# Patient Record
Sex: MALE | Race: BLACK OR AFRICAN AMERICAN | NOT HISPANIC OR LATINO | URBAN - METROPOLITAN AREA
[De-identification: names, ages, dates, MRNs, and addresses within clinical notes are randomized per-mention and may not be internally consistent; named-entity substitution may affect disease eponyms.]

---

## 2020-11-23 ENCOUNTER — OFFICE VISIT (OUTPATIENT)
Dept: URBAN - METROPOLITAN AREA SURGERY CENTER 27 | Facility: SURGERY CENTER | Age: 75
End: 2020-11-23

## 2021-07-14 ENCOUNTER — OFFICE VISIT (OUTPATIENT)
Dept: URBAN - METROPOLITAN AREA CLINIC 84 | Facility: CLINIC | Age: 76
End: 2021-07-14
Payer: COMMERCIAL

## 2021-07-14 ENCOUNTER — WEB ENCOUNTER (OUTPATIENT)
Dept: URBAN - METROPOLITAN AREA CLINIC 84 | Facility: CLINIC | Age: 76
End: 2021-07-14

## 2021-07-14 DIAGNOSIS — E78.5 HYPERLIPIDEMIA, UNSPECIFIED HYPERLIPIDEMIA TYPE: ICD-10-CM

## 2021-07-14 DIAGNOSIS — E11.9 TYPE 2 DIABETES MELLITUS WITHOUT COMPLICATIONS: ICD-10-CM

## 2021-07-14 DIAGNOSIS — Z79.4 LONG TERM (CURRENT) USE OF INSULIN: ICD-10-CM

## 2021-07-14 DIAGNOSIS — Z12.11 COLON CANCER SCREENING: ICD-10-CM

## 2021-07-14 DIAGNOSIS — B19.20 HEPATITIS C VIRUS INFECTION WITHOUT HEPATIC COMA, UNSPECIFIED CHRONICITY: ICD-10-CM

## 2021-07-14 DIAGNOSIS — I10 HYPERTENSION, UNSPECIFIED TYPE: ICD-10-CM

## 2021-07-14 PROCEDURE — 99204 OFFICE O/P NEW MOD 45 MIN: CPT | Performed by: INTERNAL MEDICINE

## 2021-07-14 RX ORDER — AMLODIPINE BESYLATE 10 MG/1
1 TABLET TABLET ORAL ONCE A DAY
Status: ACTIVE | COMMUNITY

## 2021-07-14 RX ORDER — KRILL/OM-3/DHA/EPA/PHOSPHO/AST 1000-230MG
1 TABLET CAPSULE ORAL ONCE A DAY
Status: ACTIVE | COMMUNITY

## 2021-07-14 RX ORDER — LISINOPRIL 40 MG/1
1 TABLET TABLET ORAL ONCE A DAY
Status: ACTIVE | COMMUNITY

## 2021-07-14 RX ORDER — GLIPIZIDE 10 MG/1
1 TABLET 30 MINUTES BEFORE BREAKFAST TABLET ORAL ONCE A DAY
Status: ACTIVE | COMMUNITY

## 2021-07-14 RX ORDER — ATORVASTATIN CALCIUM 80 MG/1
1 TABLET TABLET, FILM COATED ORAL ONCE A DAY
Status: ACTIVE | COMMUNITY

## 2021-07-14 RX ORDER — INSULIN GLARGINE 100 [IU]/ML
AS DIRECTED INJECTION, SOLUTION SUBCUTANEOUS
Status: ACTIVE | COMMUNITY

## 2021-07-23 ENCOUNTER — OFFICE VISIT (OUTPATIENT)
Dept: URBAN - METROPOLITAN AREA SURGERY CENTER 20 | Facility: SURGERY CENTER | Age: 76
End: 2021-07-23
Payer: COMMERCIAL

## 2021-07-23 DIAGNOSIS — Z12.11 COLON CANCER SCREENING: ICD-10-CM

## 2021-07-23 PROCEDURE — G0121 COLON CA SCRN NOT HI RSK IND: HCPCS | Performed by: INTERNAL MEDICINE

## 2021-07-23 PROCEDURE — G8907 PT DOC NO EVENTS ON DISCHARG: HCPCS | Performed by: INTERNAL MEDICINE

## 2021-07-23 RX ORDER — GLIPIZIDE 10 MG/1
1 TABLET 30 MINUTES BEFORE BREAKFAST TABLET ORAL ONCE A DAY
Status: ACTIVE | COMMUNITY

## 2021-07-23 RX ORDER — ATORVASTATIN CALCIUM 80 MG/1
1 TABLET TABLET, FILM COATED ORAL ONCE A DAY
Status: ACTIVE | COMMUNITY

## 2021-07-23 RX ORDER — KRILL/OM-3/DHA/EPA/PHOSPHO/AST 1000-230MG
1 TABLET CAPSULE ORAL ONCE A DAY
Status: ACTIVE | COMMUNITY

## 2021-07-23 RX ORDER — INSULIN GLARGINE 100 [IU]/ML
AS DIRECTED INJECTION, SOLUTION SUBCUTANEOUS
Status: ACTIVE | COMMUNITY

## 2021-07-23 RX ORDER — AMLODIPINE BESYLATE 10 MG/1
1 TABLET TABLET ORAL ONCE A DAY
Status: ACTIVE | COMMUNITY

## 2021-07-23 RX ORDER — LISINOPRIL 40 MG/1
1 TABLET TABLET ORAL ONCE A DAY
Status: ACTIVE | COMMUNITY

## 2021-07-26 LAB
A/G RATIO: 1.3
ALBUMIN: 4
ALKALINE PHOSPHATASE: 74
ALT (SGPT): 151
AST (SGOT): 107
BASO (ABSOLUTE): 0
BASOS: 0
BILIRUBIN, TOTAL: 0.4
BUN/CREATININE RATIO: 17
BUN: 25
CALCIUM: 9.5
CARBON DIOXIDE, TOTAL: 20
CHLORIDE: 107
CREATININE: 1.48
EGFR IF AFRICN AM: 53
EGFR IF NONAFRICN AM: 46
EOS (ABSOLUTE): 0.1
EOS: 1
GLOBULIN, TOTAL: 3
GLUCOSE: 101
HBSAG SCREEN: NEGATIVE
HCV AB: >11
HCV GENOTYPE: (no result)
HCV LOG10: (no result)
HCV LOG10: 5.31
HEMATOCRIT: 40.9
HEMATOLOGY COMMENTS:: (no result)
HEMOGLOBIN: 13.3
HEP A AB, IGM: NEGATIVE
HEP A AB, TOTAL: NEGATIVE
HEP B CORE AB, IGM: NEGATIVE
HEP B CORE AB, TOT: POSITIVE
HEP B SURFACE AB, QUAL: NON REACTIVE
HEP BE AB: POSITIVE
HEP BE AG: NEGATIVE
HEPATITIS C GENOTYPE: (no result)
HEPATITIS C QUANTITATION: (no result)
HEPATITIS C QUANTITATION: (no result)
IMMATURE CELLS: (no result)
IMMATURE GRANS (ABS): 0
IMMATURE GRANULOCYTES: 0
INTERPRETATION:: (no result)
LYMPHS (ABSOLUTE): 3.5
LYMPHS: 50
Lab: (no result)
Lab: (no result)
MCH: 29.5
MCHC: 32.5
MCV: 91
MONOCYTES(ABSOLUTE): 0.7
MONOCYTES: 10
NEUTROPHILS (ABSOLUTE): 2.9
NEUTROPHILS: 39
NRBC: (no result)
PLATELETS: 206
POTASSIUM: 4.9
PROTEIN, TOTAL: 7
RBC: 4.51
RDW: 13
REQUEST PROBLEM: (no result)
SODIUM: 141
TEST INFORMATION:: (no result)
TEST INFORMATION:: (no result)
WBC: 7.3

## 2021-08-28 ENCOUNTER — TELEPHONE ENCOUNTER (OUTPATIENT)
Dept: URBAN - METROPOLITAN AREA CLINIC 13 | Facility: CLINIC | Age: 76
End: 2021-08-28

## 2021-08-29 ENCOUNTER — TELEPHONE ENCOUNTER (OUTPATIENT)
Dept: URBAN - METROPOLITAN AREA CLINIC 13 | Facility: CLINIC | Age: 76
End: 2021-08-29

## 2021-08-29 RX ORDER — ATORVASTATIN CALCIUM 80 MG/1
TABLET ORAL
Status: ACTIVE | COMMUNITY

## 2021-08-29 RX ORDER — GLIPIZIDE 5 MG/1
TABLET, EXTENDED RELEASE ORAL
Status: ACTIVE | COMMUNITY

## 2021-08-29 RX ORDER — ASPIRIN 81 MG/1
TABLET, COATED ORAL
Status: ACTIVE | COMMUNITY

## 2021-08-29 RX ORDER — LISINOPRIL 10 MG/1
TABLET ORAL
Status: ACTIVE | COMMUNITY

## 2021-09-07 ENCOUNTER — TELEPHONE ENCOUNTER (OUTPATIENT)
Dept: URBAN - METROPOLITAN AREA CLINIC 40 | Facility: CLINIC | Age: 76
End: 2021-09-07

## 2021-09-08 ENCOUNTER — TELEPHONE ENCOUNTER (OUTPATIENT)
Dept: URBAN - METROPOLITAN AREA CLINIC 92 | Facility: CLINIC | Age: 76
End: 2021-09-08

## 2021-10-11 ENCOUNTER — OFFICE VISIT (OUTPATIENT)
Dept: URBAN - METROPOLITAN AREA CLINIC 86 | Facility: CLINIC | Age: 76
End: 2021-10-11
Payer: COMMERCIAL

## 2021-10-11 VITALS
BODY MASS INDEX: 25.87 KG/M2 | TEMPERATURE: 96.5 F | DIASTOLIC BLOOD PRESSURE: 72 MMHG | HEART RATE: 73 BPM | SYSTOLIC BLOOD PRESSURE: 128 MMHG | HEIGHT: 72 IN | WEIGHT: 191 LBS

## 2021-10-11 DIAGNOSIS — R74.8 ELEVATED LIVER ENZYMES: ICD-10-CM

## 2021-10-11 DIAGNOSIS — E11.9 TYPE 2 DIABETES MELLITUS WITHOUT COMPLICATIONS: ICD-10-CM

## 2021-10-11 DIAGNOSIS — R93.2 ABNORMAL ULTRASOUND OF LIVER: ICD-10-CM

## 2021-10-11 DIAGNOSIS — Z71.85 VACCINE COUNSELING: ICD-10-CM

## 2021-10-11 DIAGNOSIS — B19.20 HEPATITIS C VIRUS INFECTION WITHOUT HEPATIC COMA, UNSPECIFIED CHRONICITY: ICD-10-CM

## 2021-10-11 DIAGNOSIS — Z95.0 PACEMAKER: ICD-10-CM

## 2021-10-11 DIAGNOSIS — C61 PROSTATE CANCER: ICD-10-CM

## 2021-10-11 DIAGNOSIS — F03.91 DEMENTIA: ICD-10-CM

## 2021-10-11 DIAGNOSIS — E78.5 HYPERLIPIDEMIA, UNSPECIFIED HYPERLIPIDEMIA TYPE: ICD-10-CM

## 2021-10-11 DIAGNOSIS — Z12.11 COLON CANCER SCREENING: ICD-10-CM

## 2021-10-11 DIAGNOSIS — N28.9 RENAL INSUFFICIENCY: ICD-10-CM

## 2021-10-11 DIAGNOSIS — Z79.4 LONG TERM (CURRENT) USE OF INSULIN: ICD-10-CM

## 2021-10-11 DIAGNOSIS — I10 HYPERTENSION, UNSPECIFIED TYPE: ICD-10-CM

## 2021-10-11 DIAGNOSIS — R76.8 HEPATITIS B CORE ANTIBODY POSITIVE: ICD-10-CM

## 2021-10-11 DIAGNOSIS — Z86.73: ICD-10-CM

## 2021-10-11 PROBLEM — 38341003 HYPERTENSION: Status: ACTIVE | Noted: 2020-03-02

## 2021-10-11 PROBLEM — 13644009 HYPERCHOLESTEROLEMIA: Status: ACTIVE | Noted: 2020-03-02

## 2021-10-11 PROBLEM — 313435000 TYPE I DIABETES MELLITUS WITHOUT COMPLICATION: Status: ACTIVE | Noted: 2020-03-02

## 2021-10-11 PROCEDURE — 99244 OFF/OP CNSLTJ NEW/EST MOD 40: CPT | Performed by: PHYSICIAN ASSISTANT

## 2021-10-11 RX ORDER — KRILL/OM-3/DHA/EPA/PHOSPHO/AST 1000-230MG
1 TABLET CAPSULE ORAL ONCE A DAY
Status: ACTIVE | COMMUNITY

## 2021-10-11 RX ORDER — LISINOPRIL 40 MG/1
1 TABLET TABLET ORAL ONCE A DAY
Status: ACTIVE | COMMUNITY

## 2021-10-11 RX ORDER — AMLODIPINE BESYLATE 10 MG/1
1 TABLET TABLET ORAL ONCE A DAY
Status: ACTIVE | COMMUNITY

## 2021-10-11 RX ORDER — INSULIN GLARGINE 100 [IU]/ML
AS DIRECTED INJECTION, SOLUTION SUBCUTANEOUS
Status: ACTIVE | COMMUNITY

## 2021-10-11 RX ORDER — GLIPIZIDE 10 MG/1
1 TABLET 30 MINUTES BEFORE BREAKFAST TABLET ORAL ONCE A DAY
Status: ACTIVE | COMMUNITY

## 2021-10-11 NOTE — HPI-TODAY'S VISIT:
Pt is a 75 year old male who presents today for evaluation of HCV, referred by Dr. Garry Morgan.  A copy of today's note will be sent to referring provider.  He presents today with daughter, Charlene.  He was diagnosed with Hep C about 20 years ago, genotype 1a, treatment naive.  Hx of IVDU, last used about 20 years ago.  Hx of heavy etoh use, quit in past 2-3 years.  Was drinking on average about 1-2 pints vodka per week x 40 years.  Hx of tobacco use, 1 pack per week x 20 years, quit 2 years ago.  No blood transfusions, tattoos,  background.  LFTs elevated with , , alp 74.  Plt 206.  Hep B core noted to be positive.  US 7/19/21 showing heterogenous appearance of liver with midly nodular contour concerning for possible chronic hepatic disease, cirrhosis.  He has a pacemaker, unsure if MRI compatible- will check with cardiologist regarding this.  Unclear if ever had EGD in past.   Has never been dx with cirrhosis in past.    Denies symptoms of abdominal distention, worsening confusion (has mild dementia), abdominal pain, changes in BM, weight changes, signs of GI bleeding.     Hx of DM on insulin- last a1c around 6%, only recent controlled over past year.  Hx of atleast 6 years of poorly controlled DM which led to elevated creatinine.  No prior nephrology eval.   He has hx of HLD, prev on atorvastatin but this was d/c by pcp about 3 months ago, unclear why.  Hx of prostate cancer about 5 years ago, s/p radiation, no evidence of recurrence.  Sister with pancreatic cancer.  No known family hx of liver disease/cirrhosis.  Denies herbals/supplements taken.  Labs 7/14/21- Hep a ab total negative (recommend vaccine), glucose 101, creatinine 1.48, ast 107, alt 151, alp 74, sodium 141, potassium 4.9, wbc 7.3, hgb 13.3, plt 206, hep b core ab total positive, hep be ab positive, hep b surf ab negative (recommend vaccine), hep be ag negative, hep b surf ag negative, hep b core ab igm negative, Hep C viral load 203,000.  Colonoscopy 7/23/21 with Dr. Morgan, nonbleeding IH otherwise normal, repeat 5 years due to poor bowel prep.    US Abdominal 7/19/21- The liver is normal in size with heterogenous echotexture and normal echogenicity.  There is mildy nodular contour.  No focal hepatic lesions.  No intrahepatic or extrahepatic biliary ductal dilatation.  PV demonstrates hepatopedal flow.  Gallbladder is normal with no sludge or cholelithiasis.  CBD measures 2.2 mm.  Pancreas is unremarkable, pancreatic tail is obscured by overlying bowel gas.   The right kidney is normal size and measures 10.2 cm without hydronephrosis, mildly increased echogenicity.  No ascites.  IMPRESSION: liver has a heterogenous appearance with a mildly nodular contour which may correlate with chronic hepatic disease, cirrhosis.  Right kidney demonstrates mildly increased echogenicity which may correlate with medical renal disease.

## 2021-10-11 NOTE — EXAM-PHYSICAL EXAM
General: pleasant, well developed, well nourished, no acute distress, non ill appearing Eyes- no scleral icterus HENT: normocephalic, atraumatic head, face mask covering mouth and nose Neck: supple, no JVD Chest: normal breath sounds, normal work of breathing Heart: regular rate and rhythm without murmur Abdomen: soft, non tender, non distended, bowel sounds present, no hepatomegaly, no splenomegaly, no ascites Musculoskeletal: ambulatory with assistance of cane- presented to clinic in wheelchair Extremities: no edema, no asterixis, + palmar erythema Skin: no rashes, no jaundice Neurologic: no focal deficits, alert and oriented x 3 Psychiatric: stable mood, appropriate affect

## 2021-10-17 LAB
A/G RATIO: 1.7
ALBUMIN: 4.5
ALKALINE PHOSPHATASE: 75
ALPHA-1-ANTITRYPSIN, SERUM: 174
ALT (SGPT): 89
ANA DIRECT: NEGATIVE
AST (SGOT): 72
BASO (ABSOLUTE): 0
BASOS: 0
BILIRUBIN, TOTAL: 0.4
BUN/CREATININE RATIO: 21
BUN: 29
CALCIUM: 10
CARBON DIOXIDE, TOTAL: 20
CHLORIDE: 108
CREATININE: 1.39
EGFR IF AFRICN AM: 58
EGFR IF NONAFRICN AM: 50
EOS (ABSOLUTE): 0.1
EOS: 2
FERRITIN, SERUM: 623
GLOBULIN, TOTAL: 2.7
GLUCOSE: 68
HCV LOG10: 5.15
HEMATOCRIT: 43.5
HEMATOLOGY COMMENTS:: (no result)
HEMOGLOBIN: 14.1
HEPATITIS C QUANTITATION: (no result)
IMMATURE CELLS: (no result)
IMMATURE GRANS (ABS): 0
IMMATURE GRANULOCYTES: 0
IRON BIND.CAP.(TIBC): 296
IRON SATURATION: 33
IRON: 99
LYMPHS (ABSOLUTE): 3
LYMPHS: 43
MCH: 29.7
MCHC: 32.4
MCV: 92
MITOCHONDRIAL (M2) ANTIBODY: 122.6
MONOCYTES(ABSOLUTE): 1
MONOCYTES: 15
NEUTROPHILS (ABSOLUTE): 2.8
NEUTROPHILS: 40
NRBC: (no result)
PHENOTYPE (PI): (no result)
PLATELETS: 229
POTASSIUM: 4.9
PROTEIN, TOTAL: 7.2
RBC: 4.74
RDW: 12.4
SODIUM: 142
TEST INFORMATION:: (no result)
UIBC: 197
WBC: 7

## 2021-10-19 ENCOUNTER — TELEPHONE ENCOUNTER (OUTPATIENT)
Dept: URBAN - METROPOLITAN AREA CLINIC 92 | Facility: CLINIC | Age: 76
End: 2021-10-19

## 2021-10-19 ENCOUNTER — OFFICE VISIT (OUTPATIENT)
Dept: URBAN - METROPOLITAN AREA CLINIC 86 | Facility: CLINIC | Age: 76
End: 2021-10-19

## 2021-10-19 NOTE — HPI-TODAY'S VISIT:
10/12/21 labs- iron labs normal, ferritin elevated at 623, ama elevated at 122.6, alpha 1 antitrypsin 174 with normal phenotype MM, creatinine elevated 1.39 (please share with primary MD), bun elevated 29 (please share with primary MD), alk phos 75, ast 72, alt 89, audelia negative, plt 229, hgb 14.1, wbc 7, monocytes 1.0 elevated (please share with primary mD), hep c viral load 142,425.   INR and ASMA were not collected by lab so we'll need to get those with next lab draw.  Need to also check HFE (given elevated ferritin) and IGG and IGM as well given positive AMA.  We'll send those orders to [facility] for you to obain.   Any news on whether the pacemaker is MRI compatible?

## 2021-11-11 ENCOUNTER — OFFICE VISIT (OUTPATIENT)
Dept: URBAN - METROPOLITAN AREA CLINIC 86 | Facility: CLINIC | Age: 76
End: 2021-11-11

## 2021-12-03 ENCOUNTER — LAB OUTSIDE AN ENCOUNTER (OUTPATIENT)
Dept: URBAN - METROPOLITAN AREA CLINIC 86 | Facility: CLINIC | Age: 76
End: 2021-12-03

## 2021-12-07 ENCOUNTER — OFFICE VISIT (OUTPATIENT)
Dept: URBAN - METROPOLITAN AREA CLINIC 86 | Facility: CLINIC | Age: 76
End: 2021-12-07

## 2021-12-07 RX ORDER — KRILL/OM-3/DHA/EPA/PHOSPHO/AST 1000-230MG
1 TABLET CAPSULE ORAL ONCE A DAY
Status: ACTIVE | COMMUNITY

## 2021-12-07 RX ORDER — INSULIN GLARGINE 100 [IU]/ML
AS DIRECTED INJECTION, SOLUTION SUBCUTANEOUS
Status: ACTIVE | COMMUNITY

## 2021-12-07 RX ORDER — AMLODIPINE BESYLATE 10 MG/1
1 TABLET TABLET ORAL ONCE A DAY
Status: ACTIVE | COMMUNITY

## 2021-12-07 RX ORDER — GLIPIZIDE 10 MG/1
1 TABLET 30 MINUTES BEFORE BREAKFAST TABLET ORAL ONCE A DAY
Status: ACTIVE | COMMUNITY

## 2021-12-07 RX ORDER — LISINOPRIL 40 MG/1
1 TABLET TABLET ORAL ONCE A DAY
Status: ACTIVE | COMMUNITY

## 2021-12-09 ENCOUNTER — TELEPHONE ENCOUNTER (OUTPATIENT)
Dept: URBAN - METROPOLITAN AREA CLINIC 92 | Facility: CLINIC | Age: 76
End: 2021-12-09

## 2021-12-09 LAB
A/G RATIO: 1.4
ACTIN (SMOOTH MUSCLE) ANTIBODY: 7
ALBUMIN: 4.5
ALKALINE PHOSPHATASE: 68
ALPHA-1-ANTITRYPSIN, SERUM: 152
ALT (SGPT): 84
ANA DIRECT: NEGATIVE
AST (SGOT): 58
BASO (ABSOLUTE): 0
BASOS: 0
BILIRUBIN, TOTAL: 0.6
BUN/CREATININE RATIO: 21
BUN: 36
CALCIUM: 9.9
CARBON DIOXIDE, TOTAL: 19
CHLORIDE: 107
CREATININE: 1.68
EGFR IF AFRICN AM: 46
EGFR IF NONAFRICN AM: 39
EOS (ABSOLUTE): 0.1
EOS: 2
FERRITIN, SERUM: 656
GLOBULIN, TOTAL: 3.2
GLUCOSE: 110
HCV LOG10: 4.67
HEMATOCRIT: 41.7
HEMATOLOGY COMMENTS:: (no result)
HEMOGLOBIN: 13.8
HEPATITIS C QUANTITATION: (no result)
IMMATURE CELLS: (no result)
IMMATURE GRANS (ABS): 0
IMMATURE GRANULOCYTES: 0
INR: 1
IRON BIND.CAP.(TIBC): 307
IRON SATURATION: 36
IRON: 109
LYMPHS (ABSOLUTE): 2.7
LYMPHS: 44
MCH: 29.3
MCHC: 33.1
MCV: 89
MITOCHONDRIAL (M2) ANTIBODY: 126.4
MONOCYTES(ABSOLUTE): 0.6
MONOCYTES: 10
NEUTROPHILS (ABSOLUTE): 2.7
NEUTROPHILS: 44
NRBC: (no result)
PHENOTYPE (PI): (no result)
PLATELETS: 245
POTASSIUM: 5.2
PROTEIN, TOTAL: 7.7
PROTHROMBIN TIME: 10.6
RBC: 4.71
RDW: 12.5
SODIUM: 140
TEST INFORMATION:: (no result)
UIBC: 198
WBC: 6.2

## 2021-12-09 NOTE — HPI-TODAY'S VISIT:
labs 12/3/21- iron sat 36% normal, ferritin 656 elevated (prev 623), .4, alpha 1 152, normal phenotype MM, glucose 110 elevated (please share with primary MD), BUN elevated 36, creatinine 1.68 elevated (and up from previous of 1.39, please share with primary MD), chloride 107 high, sodium 140 normal, potassium 5.2 normal, albumin 4.5 normal, total bili 0.6 normal, alk phos 68 normal, ast 58 elevated, alt 84 elevated, audelia negative, wbc 6.2 normal, hemoglobin 13.8 normal, platelets 245 normal, hep c viral load 47,180, ASMA negative, inr 1.0.    It doesn't appear that [facility] paige with HFE (need due to elevated ferritin) and IGG and IGM (need due to positive AMA).  We'll send you the orders for you to obtain those labs.   Have you found out if your pacemaker is MRI compatible?

## 2021-12-23 ENCOUNTER — TELEPHONE ENCOUNTER (OUTPATIENT)
Dept: URBAN - METROPOLITAN AREA CLINIC 86 | Facility: CLINIC | Age: 76
End: 2021-12-23

## 2021-12-23 ENCOUNTER — OFFICE VISIT (OUTPATIENT)
Dept: URBAN - METROPOLITAN AREA CLINIC 86 | Facility: CLINIC | Age: 76
End: 2021-12-23
Payer: COMMERCIAL

## 2021-12-23 VITALS — WEIGHT: 191 LBS | BODY MASS INDEX: 25.87 KG/M2 | HEIGHT: 72 IN

## 2021-12-23 DIAGNOSIS — R93.2 ABNORMAL ULTRASOUND OF LIVER: ICD-10-CM

## 2021-12-23 DIAGNOSIS — B18.2 CARRIER OF VIRAL HEPATITIS C: ICD-10-CM

## 2021-12-23 DIAGNOSIS — R76.8 HEPATITIS B CORE ANTIBODY POSITIVE: ICD-10-CM

## 2021-12-23 DIAGNOSIS — Z86.73: ICD-10-CM

## 2021-12-23 DIAGNOSIS — R79.89 ELEVATED FERRITIN: ICD-10-CM

## 2021-12-23 DIAGNOSIS — R76.0 RAISED ANTIBODY TITER: ICD-10-CM

## 2021-12-23 DIAGNOSIS — R74.8 ELEVATED LIVER ENZYMES: ICD-10-CM

## 2021-12-23 DIAGNOSIS — I10 HYPERTENSION, UNSPECIFIED TYPE: ICD-10-CM

## 2021-12-23 DIAGNOSIS — E11.9 TYPE 2 DIABETES MELLITUS WITHOUT COMPLICATIONS: ICD-10-CM

## 2021-12-23 DIAGNOSIS — Z12.11 COLON CANCER SCREENING: ICD-10-CM

## 2021-12-23 DIAGNOSIS — Z71.85 VACCINE COUNSELING: ICD-10-CM

## 2021-12-23 DIAGNOSIS — Z79.4 LONG TERM (CURRENT) USE OF INSULIN: ICD-10-CM

## 2021-12-23 PROCEDURE — 99214 OFFICE O/P EST MOD 30 MIN: CPT | Performed by: PHYSICIAN ASSISTANT

## 2021-12-23 RX ORDER — KRILL/OM-3/DHA/EPA/PHOSPHO/AST 1000-230MG
1 TABLET CAPSULE ORAL ONCE A DAY
Status: ACTIVE | COMMUNITY

## 2021-12-23 RX ORDER — AMLODIPINE BESYLATE 10 MG/1
1 TABLET TABLET ORAL ONCE A DAY
Status: ACTIVE | COMMUNITY

## 2021-12-23 RX ORDER — INSULIN GLARGINE 100 [IU]/ML
AS DIRECTED INJECTION, SOLUTION SUBCUTANEOUS
Status: ACTIVE | COMMUNITY

## 2021-12-23 RX ORDER — GLIPIZIDE 10 MG/1
1 TABLET 30 MINUTES BEFORE BREAKFAST TABLET ORAL ONCE A DAY
Status: ACTIVE | COMMUNITY

## 2021-12-23 RX ORDER — LISINOPRIL 40 MG/1
1 TABLET TABLET ORAL ONCE A DAY
Status: ACTIVE | COMMUNITY

## 2021-12-23 NOTE — PHYSICAL EXAM CONSTITUTIONAL:
pleasant, well nourished, well developed, in no acute distress , normal communication ability [Restricted in physically strenuous activity but ambulatory and able to carry out work of a light or sedentary nature] : Status 1- Restricted in physically strenuous activity but ambulatory and able to carry out work of a light or sedentary nature, e.g., light house work, office work [Normal] : affect appropriate [de-identified] : B/L cataracts appreciated [de-identified] : pruritic reaction from  Avastin  treatment.  [de-identified] : CTA B/L

## 2021-12-23 NOTE — HPI-TODAY'S VISIT:
Pt is a 76 year old male who presents today for evaluation of HCV, referred by Dr. Garry Morgan.  A copy of today's note will be sent to referring provider.  Pt presents for telehealth with daughter, Charlene.  Has not yet found out if pacemaker is MRI compatible but daughter will do so.  Feels well, following with pcp for glucose/creatinine issues.  No alcohol or new meds/herbals/supplements.  WIll be due for hcc screening 01/2022 (MRI preferred but will get US if pacemaker is not compatible.   Will refer back to Dr. Morgan for EGD as US 7/2021 concerning for cirrhosis.  Need to evaluate this prior to hep c tx.  Prev discussed Epclusa in detail.  Recent LFTS improved from 07/2021.  Ferritin elevated- will get HFE,  AMA positive- will f/u on igg and igm.    labs 12/3/21- iron sat 36% normal, ferritin 656 elevated (prev 623), .4, alpha 1 152, normal phenotype MM, glucose 110 elevated (please share with primary MD), BUN elevated 36, creatinine 1.68 elevated (and up from previous of 1.39, please share with primary MD), chloride 107 high, sodium 140 normal, potassium 5.2 normal, albumin 4.5 normal, total bili 0.6 normal, alk phos 68 normal, ast 58 elevated, alt 84 elevated, audelia negative, wbc 6.2 normal, hemoglobin 13.8 normal, platelets 245 normal, hep c viral load 47,180, ASMA negative, inr 1.0.    10/12/21 labs- iron labs normal, ferritin elevated at 623, ama elevated at 122.6, alpha 1 antitrypsin 174 with normal phenotype MM, creatinine elevated 1.39 (please share with primary MD), bun elevated 29 (please share with primary MD), alk phos 75, ast 72, alt 89, audelia negative, plt 229, hgb 14.1, wbc 7, monocytes 1.0 elevated (please share with primary mD), hep c viral load 142,425.   RECAP: He presents today with daughter, Charlene.  He was diagnosed with Hep C about 20 years ago, genotype 1a, treatment naive.  Hx of IVDU, last used about 20 years ago.  Hx of heavy etoh use, quit in past 2-3 years.  Was drinking on average about 1-2 pints vodka per week x 40 years.  Hx of tobacco use, 1 pack per week x 20 years, quit 2 years ago.  No blood transfusions, tattoos,  background.  LFTs elevated 7/2021 with , , alp 74.  Plt 206.  Hep B core noted to be positive.    US 7/19/21 showing heterogenous appearance of liver with midly nodular contour concerning for possible chronic hepatic disease, cirrhosis.  He has a pacemaker, unsure if MRI compatible- will check with cardiologist regarding this.  Unclear if ever had EGD in past.   Has never been dx with cirrhosis in past.    Denies symptoms of abdominal distention, worsening confusion (has mild dementia), abdominal pain, changes in BM, weight changes, signs of GI bleeding.     Hx of DM on insulin- last a1c around 6%, only recent controlled over past year.  Hx of atleast 6 years of poorly controlled DM which led to elevated creatinine.  No prior nephrology eval, pcp is follpwing.   He has hx of HLD, prev on atorvastatin but this was d/c by pcp about 3 months ago, unclear why.  Hx of prostate cancer about 5 years ago, s/p radiation, no evidence of recurrence.  Sister with pancreatic cancer.  No known family hx of liver disease/cirrhosis.  Denies herbals/supplements taken.  Labs 7/14/21- Hep a ab total negative (recommend vaccine), glucose 101, creatinine 1.48, ast 107, alt 151, alp 74, sodium 141, potassium 4.9, wbc 7.3, hgb 13.3, plt 206, hep b core ab total positive, hep be ab positive, hep b surf ab negative (recommend vaccine), hep be ag negative, hep b surf ag negative, hep b core ab igm negative, Hep C viral load 203,000.  Colonoscopy 7/23/21 with Dr. Morgan, nonbleeding IH otherwise normal, repeat 5 years due to poor bowel prep.    US Abdominal 7/19/21- The liver is normal in size with heterogenous echotexture and normal echogenicity.  There is mildy nodular contour.  No focal hepatic lesions.  No intrahepatic or extrahepatic biliary ductal dilatation.  PV demonstrates hepatopedal flow.  Gallbladder is normal with no sludge or cholelithiasis.  CBD measures 2.2 mm.  Pancreas is unremarkable, pancreatic tail is obscured by overlying bowel gas.   The right kidney is normal size and measures 10.2 cm without hydronephrosis, mildly increased echogenicity.  No ascites.  IMPRESSION: liver has a heterogenous appearance with a mildly nodular contour which may correlate with chronic hepatic disease, cirrhosis.  Right kidney demonstrates mildly increased echogenicity which may correlate with medical renal disease.

## 2022-01-12 ENCOUNTER — OFFICE VISIT (OUTPATIENT)
Dept: URBAN - METROPOLITAN AREA CLINIC 84 | Facility: CLINIC | Age: 77
End: 2022-01-12

## 2022-01-12 RX ORDER — LISINOPRIL 40 MG/1
1 TABLET TABLET ORAL ONCE A DAY
COMMUNITY

## 2022-01-12 RX ORDER — INSULIN GLARGINE 100 [IU]/ML
AS DIRECTED INJECTION, SOLUTION SUBCUTANEOUS
COMMUNITY

## 2022-01-12 RX ORDER — KRILL/OM-3/DHA/EPA/PHOSPHO/AST 1000-230MG
1 TABLET CAPSULE ORAL ONCE A DAY
COMMUNITY

## 2022-01-12 RX ORDER — AMLODIPINE BESYLATE 10 MG/1
1 TABLET TABLET ORAL ONCE A DAY
COMMUNITY

## 2022-01-12 RX ORDER — GLIPIZIDE 10 MG/1
1 TABLET 30 MINUTES BEFORE BREAKFAST TABLET ORAL ONCE A DAY
COMMUNITY

## 2022-01-21 ENCOUNTER — LAB OUTSIDE AN ENCOUNTER (OUTPATIENT)
Dept: URBAN - METROPOLITAN AREA CLINIC 84 | Facility: CLINIC | Age: 77
End: 2022-01-21

## 2022-01-25 ENCOUNTER — OFFICE VISIT (OUTPATIENT)
Dept: URBAN - METROPOLITAN AREA TELEHEALTH 2 | Facility: TELEHEALTH | Age: 77
End: 2022-01-25
Payer: COMMERCIAL

## 2022-01-25 ENCOUNTER — TELEPHONE ENCOUNTER (OUTPATIENT)
Dept: URBAN - METROPOLITAN AREA CLINIC 105 | Facility: CLINIC | Age: 77
End: 2022-01-25

## 2022-01-25 VITALS — BODY MASS INDEX: 25.79 KG/M2 | HEIGHT: 72 IN | WEIGHT: 190.4 LBS

## 2022-01-25 DIAGNOSIS — R76.8 HEPATITIS B CORE ANTIBODY POSITIVE: ICD-10-CM

## 2022-01-25 DIAGNOSIS — B19.20 HEPATITIS C VIRUS INFECTION WITHOUT HEPATIC COMA, UNSPECIFIED CHRONICITY: ICD-10-CM

## 2022-01-25 DIAGNOSIS — R76.0 RAISED ANTIBODY TITER: ICD-10-CM

## 2022-01-25 DIAGNOSIS — R79.89 ELEVATED FERRITIN: ICD-10-CM

## 2022-01-25 PROCEDURE — 99214 OFFICE O/P EST MOD 30 MIN: CPT | Performed by: PHYSICIAN ASSISTANT

## 2022-01-25 RX ORDER — GLIPIZIDE 10 MG/1
1 TABLET 30 MINUTES BEFORE BREAKFAST TABLET ORAL ONCE A DAY
Status: ACTIVE | COMMUNITY

## 2022-01-25 RX ORDER — INSULIN GLARGINE 100 [IU]/ML
AS DIRECTED INJECTION, SOLUTION SUBCUTANEOUS
Status: ACTIVE | COMMUNITY

## 2022-01-25 RX ORDER — KRILL/OM-3/DHA/EPA/PHOSPHO/AST 1000-230MG
1 TABLET CAPSULE ORAL ONCE A DAY
Status: ACTIVE | COMMUNITY

## 2022-01-25 RX ORDER — AMLODIPINE BESYLATE 10 MG/1
1 TABLET TABLET ORAL ONCE A DAY
Status: ACTIVE | COMMUNITY

## 2022-01-25 RX ORDER — LISINOPRIL 40 MG/1
1 TABLET TABLET ORAL ONCE A DAY
Status: ACTIVE | COMMUNITY

## 2022-01-25 NOTE — HPI-TODAY'S VISIT:
Pt is a 76 year old male who presents today for evaluation of HCV, referred by Dr. Garry Morgan.  A copy of today's note will be sent to referring provider.  Pt presents for telehealth with daughter, Charlene.  He had COVID shortly after [place], recovering well.   Missed Jan appt with Dr. Morgan for EGD due to COVID will get this rescheduled.  Lab work done on Friday.   Has still not yet found out if pacemaker is MRI compatible but daughter will do so today.  Feels well, following with pcp for glucose/creatinine issues.  No alcohol or new meds/herbals/supplements.  WIll be due for hcc screening 01/2022 (MRI preferred but will get US if pacemaker is not compatible).   Will refer back to Dr. Morgan for EGD for variceal screening as US 7/2021 concerning for cirrhosis.  Need to evaluate this prior to hep c tx.  Prev discussed Epclusa in detail.  Recent LFTS 12/27/21 improved from 12/3/2021.  Ferritin elevated- will get HFE,  AMA positive- will f/u on igg and igm.    labs 12/27/21- hgb 12.9, wbc 9.9, plt 198, creatinine 1.5, alt 74, ast 45, alk phos 64, tbili 0.9, sodium 143.  labs 12/3/21- iron sat 36% normal, ferritin 656 elevated (prev 623), .4, alpha 1 152, normal phenotype MM, glucose 110 elevated (please share with primary MD), BUN elevated 36, creatinine 1.68 elevated (and up from previous of 1.39, please share with primary MD), chloride 107 high, sodium 140 normal, potassium 5.2 normal, albumin 4.5 normal, total bili 0.6 normal, alk phos 68 normal, ast 58 elevated, alt 84 elevated, audelia negative, wbc 6.2 normal, hemoglobin 13.8 normal, platelets 245 normal, hep c viral load 47,180, ASMA negative, inr 1.0.    10/12/21 labs- iron labs normal, ferritin elevated at 623, ama elevated at 122.6, alpha 1 antitrypsin 174 with normal phenotype MM, creatinine elevated 1.39 (please share with primary MD), bun elevated 29 (please share with primary MD), alk phos 75, ast 72, alt 89, audelia negative, plt 229, hgb 14.1, wbc 7, monocytes 1.0 elevated (please share with primary mD), hep c viral load 142,425.   RECAP: He presents today with daughter, Charlene.  He was diagnosed with Hep C about 20 years ago, genotype 1a, treatment naive.  Hx of IVDU, last used about 20 years ago.  Hx of heavy etoh use, quit in past 2-3 years.  Was drinking on average about 1-2 pints vodka per week x 40 years.  Hx of tobacco use, 1 pack per week x 20 years, quit 2 years ago.  No blood transfusions, tattoos,  background.  LFTs elevated 7/2021 with , , alp 74.  Plt 206.  Hep B core noted to be positive.    US 7/19/21 showing heterogenous appearance of liver with midly nodular contour concerning for possible chronic hepatic disease, cirrhosis.  He has a pacemaker, unsure if MRI compatible- will check with cardiologist regarding this.  Unclear if ever had EGD in past.   Has never been dx with cirrhosis in past.    Denies symptoms of abdominal distention, worsening confusion (has mild dementia), abdominal pain, changes in BM, weight changes, signs of GI bleeding.     Hx of DM on insulin- last a1c around 6%, only recent controlled over past year.  Hx of atleast 6 years of poorly controlled DM which led to elevated creatinine.  No prior nephrology eval, pcp is follpwing.   He has hx of HLD, prev on atorvastatin but this was d/c by pcp about 3 months ago, unclear why.  Hx of prostate cancer about 5 years ago, s/p radiation, no evidence of recurrence.  Sister with pancreatic cancer.  No known family hx of liver disease/cirrhosis.  Denies herbals/supplements taken.  Labs 7/14/21- Hep a ab total negative (recommend vaccine), glucose 101, creatinine 1.48, ast 107, alt 151, alp 74, sodium 141, potassium 4.9, wbc 7.3, hgb 13.3, plt 206, hep b core ab total positive, hep be ab positive, hep b surf ab negative (recommend vaccine), hep be ag negative, hep b surf ag negative, hep b core ab igm negative, Hep C viral load 203,000.  Colonoscopy 7/23/21 with Dr. Morgan, nonbleeding IH otherwise normal, repeat 5 years due to poor bowel prep.    US Abdominal 7/19/21- The liver is normal in size with heterogenous echotexture and normal echogenicity.  There is mildy nodular contour.  No focal hepatic lesions.  No intrahepatic or extrahepatic biliary ductal dilatation.  PV demonstrates hepatopedal flow.  Gallbladder is normal with no sludge or cholelithiasis.  CBD measures 2.2 mm.  Pancreas is unremarkable, pancreatic tail is obscured by overlying bowel gas.   The right kidney is normal size and measures 10.2 cm without hydronephrosis, mildly increased echogenicity.  No ascites.  IMPRESSION: liver has a heterogenous appearance with a mildly nodular contour which may correlate with chronic hepatic disease, cirrhosis.  Right kidney demonstrates mildly increased echogenicity which may correlate with medical renal disease.

## 2022-01-28 LAB
A/G RATIO: 1.3
ALBUMIN: 4
ALKALINE PHOSPHATASE: 66
ALT (SGPT): 62
AST (SGOT): 46
BASO (ABSOLUTE): 0
BASOS: 0
BILIRUBIN, TOTAL: 0.4
BUN/CREATININE RATIO: 18
BUN: 34
CALCIUM: 9.5
CARBON DIOXIDE, TOTAL: 19
CHLORIDE: 106
CREATININE: 1.84
EGFR IF AFRICN AM: 41
EGFR IF NONAFRICN AM: 35
EOS (ABSOLUTE): 0
EOS: 1
GLOBULIN, TOTAL: 3.1
GLUCOSE: 49
HBV LOG10: (no result)
HCV LOG10: 4.72
HEMATOCRIT: 38.8
HEMATOLOGY COMMENTS:: (no result)
HEMOGLOBIN: 13
HEPATITIS B QUANTITATION: (no result)
HEPATITIS C QUANTITATION: (no result)
HEREDITARY  HEMOCHROMATOSIS: (no result)
IMMATURE CELLS: (no result)
IMMATURE GRANS (ABS): 0
IMMATURE GRANULOCYTES: 0
IMMUNOGLOBULIN G, QN, SERUM: 1712
IMMUNOGLOBULIN M, QN, SERUM: 40
LYMPHS (ABSOLUTE): 2.2
LYMPHS: 38
MCH: 29.9
MCHC: 33.5
MCV: 89
MONOCYTES(ABSOLUTE): 0.5
MONOCYTES: 8
NEUTROPHILS (ABSOLUTE): 3.1
NEUTROPHILS: 53
NRBC: (no result)
PLATELETS: 209
POTASSIUM: 5.6
PROTEIN, TOTAL: 7.1
RBC: 4.35
RDW: 12.2
SODIUM: 139
TEST INFORMATION:: (no result)
TEST INFORMATION:: (no result)
WBC: 5.9

## 2022-01-31 ENCOUNTER — LAB OUTSIDE AN ENCOUNTER (OUTPATIENT)
Dept: URBAN - METROPOLITAN AREA CLINIC 86 | Facility: CLINIC | Age: 77
End: 2022-01-31

## 2022-01-31 ENCOUNTER — TELEPHONE ENCOUNTER (OUTPATIENT)
Dept: URBAN - METROPOLITAN AREA CLINIC 92 | Facility: CLINIC | Age: 77
End: 2022-01-31

## 2022-01-31 PROBLEM — 429993008 HISTORY OF CEREBROVASCULAR ACCIDENT WITHOUT RESIDUAL DEFICITS: Status: ACTIVE | Noted: 2020-03-02

## 2022-01-31 PROBLEM — 736687002: Status: ACTIVE | Noted: 2021-10-11

## 2022-01-31 PROBLEM — 55822004: Status: ACTIVE | Noted: 2021-07-14

## 2022-01-31 PROBLEM — 305058001: Status: ACTIVE | Noted: 2021-07-14

## 2022-01-31 PROBLEM — 254900004 PROSTATE CANCER: Status: ACTIVE | Noted: 2020-03-02

## 2022-01-31 PROBLEM — 38341003: Status: ACTIVE | Noted: 2021-07-14

## 2022-01-31 PROBLEM — 707724006: Status: ACTIVE | Noted: 2021-10-11

## 2022-01-31 PROBLEM — 15633881000119102: Status: ACTIVE | Noted: 2021-10-11

## 2022-01-31 PROBLEM — 441509002: Status: ACTIVE | Noted: 2021-10-11

## 2022-01-31 PROBLEM — 710815001: Status: ACTIVE | Noted: 2021-07-14

## 2022-01-31 PROBLEM — 52448006 DEMENTIA: Status: ACTIVE | Noted: 2020-02-29

## 2022-01-31 PROBLEM — 62484002: Status: ACTIVE | Noted: 2021-12-23

## 2022-01-31 PROBLEM — 313436004: Status: ACTIVE | Noted: 2021-07-14

## 2022-01-31 PROBLEM — 441781003: Status: ACTIVE | Noted: 2021-10-19

## 2022-01-31 PROBLEM — 443913008: Status: ACTIVE | Noted: 2021-10-11

## 2022-01-31 PROBLEM — 236423003: Status: ACTIVE | Noted: 2021-10-11

## 2022-02-21 ENCOUNTER — TELEPHONE ENCOUNTER (OUTPATIENT)
Dept: URBAN - METROPOLITAN AREA CLINIC 92 | Facility: CLINIC | Age: 77
End: 2022-02-21

## 2022-02-21 NOTE — HPI-TODAY'S VISIT:
labs 1/21/22- igg 1712 elevated, igm 40 normal, glucose 49 low, creatinine 1.84 elevated (this is up from previous of 1.68- important to share with primary md) potassium 5.6 elevated (please share with primary md), alk phos 66 (prev 68), ast 46 high (prev 58), alt 62 high (prev 84), hemochromatosis negative, hep c detected at 52,899.    Please give us a call to schedule a follow up appointment as I don't see one scheduled.  Also need to know if his pacemaker is MRI compatible.  If we can't get an MRI, we need to obtain an ultrasound and get him set up for EGD.

## 2022-02-22 ENCOUNTER — LAB OUTSIDE AN ENCOUNTER (OUTPATIENT)
Dept: URBAN - METROPOLITAN AREA CLINIC 86 | Facility: CLINIC | Age: 77
End: 2022-02-22

## 2022-07-01 RX ORDER — AMLODIPINE BESYLATE 10 MG/1
TABLET ORAL
COMMUNITY

## 2022-07-01 RX ORDER — INSULIN GLARGINE 100 [IU]/ML
INJECTION, SOLUTION SUBCUTANEOUS
COMMUNITY

## 2022-07-01 RX ORDER — ATORVASTATIN CALCIUM 80 MG/1
TABLET, FILM COATED ORAL
COMMUNITY

## 2022-07-01 RX ORDER — GLIPIZIDE 10 MG/1
TABLET, FILM COATED, EXTENDED RELEASE ORAL
COMMUNITY

## 2022-07-01 RX ORDER — LISINOPRIL 10 MG/1
TABLET ORAL
COMMUNITY

## 2023-05-24 ENCOUNTER — OFFICE VISIT (OUTPATIENT)
Dept: URBAN - METROPOLITAN AREA CLINIC 84 | Facility: CLINIC | Age: 78
End: 2023-05-24
Payer: COMMERCIAL

## 2023-05-24 ENCOUNTER — DASHBOARD ENCOUNTERS (OUTPATIENT)
Age: 78
End: 2023-05-24

## 2023-05-24 VITALS
HEIGHT: 72 IN | TEMPERATURE: 97.5 F | DIASTOLIC BLOOD PRESSURE: 69 MMHG | SYSTOLIC BLOOD PRESSURE: 163 MMHG | BODY MASS INDEX: 23.78 KG/M2 | WEIGHT: 175.6 LBS | HEART RATE: 64 BPM

## 2023-05-24 DIAGNOSIS — B18.2 CHRONIC HEPATITIS C WITHOUT HEPATIC COMA: ICD-10-CM

## 2023-05-24 PROBLEM — 370992007: Status: ACTIVE | Noted: 2023-05-24

## 2023-05-24 PROBLEM — 428262008: Status: ACTIVE | Noted: 2023-05-24

## 2023-05-24 PROBLEM — 44054006: Status: ACTIVE | Noted: 2023-05-24

## 2023-05-24 PROBLEM — 128302006: Status: ACTIVE | Noted: 2023-05-24

## 2023-05-24 PROCEDURE — 99204 OFFICE O/P NEW MOD 45 MIN: CPT | Performed by: INTERNAL MEDICINE

## 2023-05-24 PROCEDURE — 99214 OFFICE O/P EST MOD 30 MIN: CPT | Performed by: INTERNAL MEDICINE

## 2023-05-24 RX ORDER — KRILL/OM-3/DHA/EPA/PHOSPHO/AST 1000-230MG
1 TABLET CAPSULE ORAL ONCE A DAY
Status: ACTIVE | COMMUNITY

## 2023-05-24 RX ORDER — INSULIN GLARGINE 100 [IU]/ML
AS DIRECTED INJECTION, SOLUTION SUBCUTANEOUS
Status: ACTIVE | COMMUNITY

## 2023-05-24 RX ORDER — GLIPIZIDE 10 MG/1
1 TABLET 30 MINUTES BEFORE BREAKFAST TABLET ORAL ONCE A DAY
Status: ACTIVE | COMMUNITY

## 2023-05-24 RX ORDER — LISINOPRIL 40 MG/1
1 TABLET TABLET ORAL ONCE A DAY
Status: ACTIVE | COMMUNITY

## 2023-05-24 RX ORDER — AMLODIPINE BESYLATE 10 MG/1
1 TABLET TABLET ORAL ONCE A DAY
Status: ACTIVE | COMMUNITY

## 2023-06-17 LAB
A/G RATIO: 1.3
ALBUMIN: 4.2
ALKALINE PHOSPHATASE: 70
ALT (SGPT): 57
AST (SGOT): 39
BASO (ABSOLUTE): 0
BASOS: 0
BILIRUBIN, TOTAL: 0.3
BUN/CREATININE RATIO: 24
BUN: 38
CALCIUM: 9.2
CARBON DIOXIDE, TOTAL: 19
CHLORIDE: 109
CREATININE: 1.57
EGFR: 46
EOS (ABSOLUTE): 0.1
EOS: 1
GLOBULIN, TOTAL: 3.2
GLUCOSE: 170
HCV LOG10: 4.59
HEMATOCRIT: 37.4
HEMOGLOBIN: 12.6
HEPATITIS C GENOTYPE: (no result)
HEPATITIS C QUANTITATION: (no result)
IMMATURE GRANS (ABS): 0
IMMATURE GRANULOCYTES: 0
LYMPHS (ABSOLUTE): 2.4
LYMPHS: 40
MCH: 30.2
MCHC: 33.7
MCV: 90
MONOCYTES(ABSOLUTE): 0.6
MONOCYTES: 10
NEUTROPHILS (ABSOLUTE): 2.8
NEUTROPHILS: 49
OCCULT BLOOD, FECAL, IA: NEGATIVE
PLATELETS: 169
POTASSIUM: 5.5
PROTEIN, TOTAL: 7.4
RBC: 4.17
RDW: 12.7
SODIUM: 141
WBC: 5.9